# Patient Record
Sex: MALE | Race: WHITE | NOT HISPANIC OR LATINO | URBAN - METROPOLITAN AREA
[De-identification: names, ages, dates, MRNs, and addresses within clinical notes are randomized per-mention and may not be internally consistent; named-entity substitution may affect disease eponyms.]

---

## 2019-02-04 ENCOUNTER — OUTPATIENT (OUTPATIENT)
Dept: OUTPATIENT SERVICES | Facility: HOSPITAL | Age: 74
LOS: 1 days | End: 2019-02-04

## 2019-09-12 PROBLEM — Z00.00 ENCOUNTER FOR PREVENTIVE HEALTH EXAMINATION: Status: ACTIVE | Noted: 2019-09-12

## 2019-09-13 ENCOUNTER — APPOINTMENT (OUTPATIENT)
Age: 74
End: 2019-09-13
Payer: MEDICARE

## 2019-09-13 PROCEDURE — 72141 MRI NECK SPINE W/O DYE: CPT

## 2021-03-22 ENCOUNTER — NON-APPOINTMENT (OUTPATIENT)
Age: 76
End: 2021-03-22

## 2021-03-22 VITALS — BODY MASS INDEX: 26.41 KG/M2 | WEIGHT: 195 LBS | HEIGHT: 72 IN

## 2021-03-22 NOTE — HISTORY OF PRESENT ILLNESS
[TextBox_13] : Referred by Dr. Santos Ko.\par \par Mr. NIEVES is a 75 year old male with a history of high cholesterol, HTN, emphysema and prostate CA s/p total prostatectomy.\par \par He was called to review eligibility for Low-Dose CT lung cancer screening.  Reviewed and confirmed that the patient meets screening eligibility criteria:\par \par 75 years old \par \par Smoking Status: Former smoker\par \par Number of pack(s) per day: 1\par Number of years smoked: 35\par Number of pack years smokin\par \par Number of years since quitting smokin\par Quit year: \par \par Mr. NIEVES denies any symptoms of lung cancer, including new cough, change in cough, hemoptysis, and unintentional weight loss.\par \par Mr. NIEVES denies any personal history of lung cancer.  No lung cancer in a first degree relative.  Admits to exposure to asbestos.

## 2021-03-26 ENCOUNTER — APPOINTMENT (OUTPATIENT)
Dept: CT IMAGING | Facility: CLINIC | Age: 76
End: 2021-03-26
Payer: MEDICARE

## 2021-03-26 PROCEDURE — 71271 CT THORAX LUNG CANCER SCR C-: CPT

## 2021-06-09 ENCOUNTER — APPOINTMENT (OUTPATIENT)
Dept: OPHTHALMOLOGY | Facility: CLINIC | Age: 76
End: 2021-06-09

## 2021-07-21 ENCOUNTER — APPOINTMENT (OUTPATIENT)
Dept: OPHTHALMOLOGY | Facility: CLINIC | Age: 76
End: 2021-07-21
Payer: MEDICARE

## 2021-07-21 ENCOUNTER — NON-APPOINTMENT (OUTPATIENT)
Age: 76
End: 2021-07-21

## 2021-07-21 PROCEDURE — 92250 FUNDUS PHOTOGRAPHY W/I&R: CPT

## 2021-07-21 PROCEDURE — 92014 COMPRE OPH EXAM EST PT 1/>: CPT

## 2022-01-10 ENCOUNTER — APPOINTMENT (OUTPATIENT)
Dept: CT IMAGING | Facility: CLINIC | Age: 77
End: 2022-01-10
Payer: MEDICARE

## 2022-01-10 PROCEDURE — 71250 CT THORAX DX C-: CPT | Mod: MH

## 2022-01-19 ENCOUNTER — NON-APPOINTMENT (OUTPATIENT)
Age: 77
End: 2022-01-19

## 2022-01-19 ENCOUNTER — APPOINTMENT (OUTPATIENT)
Dept: OPHTHALMOLOGY | Facility: CLINIC | Age: 77
End: 2022-01-19
Payer: MEDICARE

## 2022-01-19 PROCEDURE — 92014 COMPRE OPH EXAM EST PT 1/>: CPT

## 2022-01-19 PROCEDURE — 92133 CPTRZD OPH DX IMG PST SGM ON: CPT

## 2022-01-28 ENCOUNTER — APPOINTMENT (OUTPATIENT)
Dept: OPHTHALMOLOGY | Facility: CLINIC | Age: 77
End: 2022-01-28
Payer: MEDICARE

## 2022-01-28 ENCOUNTER — NON-APPOINTMENT (OUTPATIENT)
Age: 77
End: 2022-01-28

## 2022-01-28 PROCEDURE — 92015 DETERMINE REFRACTIVE STATE: CPT

## 2022-07-07 ENCOUNTER — NON-APPOINTMENT (OUTPATIENT)
Age: 77
End: 2022-07-07

## 2022-07-07 ENCOUNTER — APPOINTMENT (OUTPATIENT)
Dept: OPHTHALMOLOGY | Facility: CLINIC | Age: 77
End: 2022-07-07

## 2022-07-07 PROCEDURE — 92083 EXTENDED VISUAL FIELD XM: CPT

## 2022-07-07 PROCEDURE — 92133 CPTRZD OPH DX IMG PST SGM ON: CPT

## 2022-07-20 ENCOUNTER — NON-APPOINTMENT (OUTPATIENT)
Age: 77
End: 2022-07-20

## 2022-07-20 ENCOUNTER — APPOINTMENT (OUTPATIENT)
Dept: OPHTHALMOLOGY | Facility: CLINIC | Age: 77
End: 2022-07-20

## 2022-07-20 PROCEDURE — 92014 COMPRE OPH EXAM EST PT 1/>: CPT

## 2022-11-19 ENCOUNTER — APPOINTMENT (OUTPATIENT)
Dept: RADIOLOGY | Facility: CLINIC | Age: 77
End: 2022-11-19

## 2022-11-19 PROCEDURE — 72100 X-RAY EXAM L-S SPINE 2/3 VWS: CPT

## 2023-01-11 ENCOUNTER — NON-APPOINTMENT (OUTPATIENT)
Age: 78
End: 2023-01-11

## 2023-01-11 ENCOUNTER — APPOINTMENT (OUTPATIENT)
Dept: OPHTHALMOLOGY | Facility: CLINIC | Age: 78
End: 2023-01-11
Payer: MEDICARE

## 2023-01-11 PROCEDURE — 92133 CPTRZD OPH DX IMG PST SGM ON: CPT

## 2023-01-11 PROCEDURE — 92014 COMPRE OPH EXAM EST PT 1/>: CPT

## 2023-02-17 ENCOUNTER — APPOINTMENT (OUTPATIENT)
Dept: OPHTHALMOLOGY | Facility: CLINIC | Age: 78
End: 2023-02-17
Payer: SELF-PAY

## 2023-02-17 ENCOUNTER — NON-APPOINTMENT (OUTPATIENT)
Age: 78
End: 2023-02-17

## 2023-02-17 ENCOUNTER — APPOINTMENT (OUTPATIENT)
Dept: OPHTHALMOLOGY | Facility: CLINIC | Age: 78
End: 2023-02-17
Payer: MEDICARE

## 2023-02-17 PROCEDURE — 92015 DETERMINE REFRACTIVE STATE: CPT

## 2023-02-17 PROCEDURE — ZZZZZ: CPT

## 2023-02-20 ENCOUNTER — APPOINTMENT (OUTPATIENT)
Dept: MRI IMAGING | Facility: CLINIC | Age: 78
End: 2023-02-20
Payer: MEDICARE

## 2023-02-20 PROCEDURE — 70551 MRI BRAIN STEM W/O DYE: CPT | Mod: MH

## 2023-06-29 ENCOUNTER — APPOINTMENT (OUTPATIENT)
Dept: OPHTHALMOLOGY | Facility: CLINIC | Age: 78
End: 2023-06-29
Payer: MEDICARE

## 2023-06-29 ENCOUNTER — NON-APPOINTMENT (OUTPATIENT)
Age: 78
End: 2023-06-29

## 2023-06-29 PROCEDURE — 92083 EXTENDED VISUAL FIELD XM: CPT

## 2023-06-29 PROCEDURE — 92133 CPTRZD OPH DX IMG PST SGM ON: CPT

## 2023-07-12 ENCOUNTER — NON-APPOINTMENT (OUTPATIENT)
Age: 78
End: 2023-07-12

## 2023-07-12 ENCOUNTER — APPOINTMENT (OUTPATIENT)
Dept: OPHTHALMOLOGY | Facility: CLINIC | Age: 78
End: 2023-07-12
Payer: MEDICARE

## 2023-07-12 PROCEDURE — 92014 COMPRE OPH EXAM EST PT 1/>: CPT

## 2024-01-28 ENCOUNTER — NON-APPOINTMENT (OUTPATIENT)
Age: 79
End: 2024-01-28

## 2024-01-28 VITALS — BODY MASS INDEX: 24.11 KG/M2 | HEIGHT: 72 IN | WEIGHT: 178 LBS

## 2024-01-28 DIAGNOSIS — Z87.891 PERSONAL HISTORY OF NICOTINE DEPENDENCE: ICD-10-CM

## 2024-01-28 NOTE — HISTORY OF PRESENT ILLNESS
[Former] : Former [>=20 Pack-Years] : Twenty pack years or greater smoking history: Yes [PacksperYear] : 35 [TextBox_13] : Referred by Dr. Santos Ko.  Mr. NIEVES is a 78 year old male with a history of high cholesterol, HTN, emphysema and prostate CA s/p total prostatectomy.  He was called to review eligibility for Low-Dose CT lung cancer screening.  Reviewed and confirmed that the patient meets screening eligibility criteria:  78 years old   Smoking Status: Former smoker  Number of pack(s) per day: 1 Number of years smoked: 35 Number of pack years smokin  Number of years since quitting smokin Quit year:   Mr. NIEVES denies any symptoms of lung cancer, including new cough, change in cough, hemoptysis, and unintentional weight loss.  Mr. NIEVES denies any personal history of lung cancer.  No lung cancer in a first degree relative.  Admits to exposure to asbestos.

## 2024-01-28 NOTE — PLAN
[FreeTextEntry1] : - Low Dose CT chest for lung cancer screening.\par  \par  - Encouraged continued smoking abstinence\par

## 2024-02-08 ENCOUNTER — APPOINTMENT (OUTPATIENT)
Dept: CT IMAGING | Facility: CLINIC | Age: 79
End: 2024-02-08
Payer: MEDICARE

## 2024-02-08 PROCEDURE — 71271 CT THORAX LUNG CANCER SCR C-: CPT

## 2024-02-13 ENCOUNTER — TRANSCRIPTION ENCOUNTER (OUTPATIENT)
Age: 79
End: 2024-02-13

## 2024-05-01 ENCOUNTER — APPOINTMENT (OUTPATIENT)
Dept: OPHTHALMOLOGY | Facility: CLINIC | Age: 79
End: 2024-05-01
Payer: MEDICARE

## 2024-05-01 ENCOUNTER — NON-APPOINTMENT (OUTPATIENT)
Age: 79
End: 2024-05-01

## 2024-05-01 PROCEDURE — 92133 CPTRZD OPH DX IMG PST SGM ON: CPT

## 2024-05-01 PROCEDURE — 92014 COMPRE OPH EXAM EST PT 1/>: CPT

## 2024-10-12 ENCOUNTER — NON-APPOINTMENT (OUTPATIENT)
Age: 79
End: 2024-10-12

## 2024-10-17 ENCOUNTER — APPOINTMENT (OUTPATIENT)
Dept: OPHTHALMOLOGY | Facility: CLINIC | Age: 79
End: 2024-10-17
Payer: MEDICARE

## 2024-10-17 ENCOUNTER — NON-APPOINTMENT (OUTPATIENT)
Age: 79
End: 2024-10-17

## 2024-10-17 PROCEDURE — 92133 CPTRZD OPH DX IMG PST SGM ON: CPT

## 2024-10-17 PROCEDURE — 92083 EXTENDED VISUAL FIELD XM: CPT

## 2024-10-24 ENCOUNTER — APPOINTMENT (OUTPATIENT)
Dept: OPHTHALMOLOGY | Facility: CLINIC | Age: 79
End: 2024-10-24
Payer: MEDICARE

## 2024-10-24 ENCOUNTER — NON-APPOINTMENT (OUTPATIENT)
Age: 79
End: 2024-10-24

## 2024-10-24 PROCEDURE — 92014 COMPRE OPH EXAM EST PT 1/>: CPT

## 2025-05-19 ENCOUNTER — NON-APPOINTMENT (OUTPATIENT)
Age: 80
End: 2025-05-19

## 2025-05-22 ENCOUNTER — APPOINTMENT (OUTPATIENT)
Dept: CT IMAGING | Facility: CLINIC | Age: 80
End: 2025-05-22

## 2025-05-22 PROCEDURE — 71250 CT THORAX DX C-: CPT

## 2025-07-21 ENCOUNTER — NON-APPOINTMENT (OUTPATIENT)
Age: 80
End: 2025-07-21

## 2025-07-23 ENCOUNTER — APPOINTMENT (OUTPATIENT)
Dept: OPHTHALMOLOGY | Facility: CLINIC | Age: 80
End: 2025-07-23
Payer: MEDICARE

## 2025-07-23 ENCOUNTER — NON-APPOINTMENT (OUTPATIENT)
Age: 80
End: 2025-07-23

## 2025-07-23 PROCEDURE — 92133 CPTRZD OPH DX IMG PST SGM ON: CPT

## 2025-07-23 PROCEDURE — 92014 COMPRE OPH EXAM EST PT 1/>: CPT
